# Patient Record
Sex: MALE | Race: AMERICAN INDIAN OR ALASKA NATIVE
[De-identification: names, ages, dates, MRNs, and addresses within clinical notes are randomized per-mention and may not be internally consistent; named-entity substitution may affect disease eponyms.]

---

## 2020-04-23 ENCOUNTER — HOSPITAL ENCOUNTER (EMERGENCY)
Dept: HOSPITAL 5 - ED | Age: 40
LOS: 1 days | Discharge: HOME | End: 2020-04-24
Payer: SELF-PAY
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
## 2020-04-23 DIAGNOSIS — F10.129: ICD-10-CM
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
## 2020-04-23 DIAGNOSIS — S39.82XA: Primary | ICD-10-CM
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
## 2020-04-23 PROCEDURE — 96366 THER/PROPH/DIAG IV INF ADDON: CPT

## 2020-04-23 PROCEDURE — G0480 DRUG TEST DEF 1-7 CLASSES: HCPCS

## 2020-04-23 PROCEDURE — 99285 EMERGENCY DEPT VISIT HI MDM: CPT

## 2020-04-23 PROCEDURE — 70450 CT HEAD/BRAIN W/O DYE: CPT

## 2020-04-23 PROCEDURE — 71260 CT THORAX DX C+: CPT

## 2020-04-23 PROCEDURE — 72072 X-RAY EXAM THORAC SPINE 3VWS: CPT

## 2020-04-23 PROCEDURE — 72125 CT NECK SPINE W/O DYE: CPT

## 2020-04-23 PROCEDURE — 85730 THROMBOPLASTIN TIME PARTIAL: CPT

## 2020-04-23 PROCEDURE — 96365 THER/PROPH/DIAG IV INF INIT: CPT

## 2020-04-23 PROCEDURE — 85610 PROTHROMBIN TIME: CPT

## 2020-04-23 PROCEDURE — 36415 COLL VENOUS BLD VENIPUNCTURE: CPT

## 2020-04-23 PROCEDURE — 80053 COMPREHEN METABOLIC PANEL: CPT

## 2020-04-23 PROCEDURE — 96372 THER/PROPH/DIAG INJ SC/IM: CPT

## 2020-04-23 PROCEDURE — 85025 COMPLETE CBC W/AUTO DIFF WBC: CPT

## 2020-04-23 PROCEDURE — 72100 X-RAY EXAM L-S SPINE 2/3 VWS: CPT

## 2020-04-23 PROCEDURE — 80320 DRUG SCREEN QUANTALCOHOLS: CPT

## 2020-04-23 PROCEDURE — 74177 CT ABD & PELVIS W/CONTRAST: CPT

## 2020-04-24 VITALS — SYSTOLIC BLOOD PRESSURE: 159 MMHG | DIASTOLIC BLOOD PRESSURE: 92 MMHG

## 2020-04-24 LAB
ALBUMIN SERPL-MCNC: 3.9 G/DL (ref 3.9–5)
ALT SERPL-CCNC: 63 UNITS/L (ref 7–56)
APTT BLD: 30.6 SEC. (ref 24.2–36.6)
BASOPHILS # (AUTO): 0.1 K/MM3 (ref 0–0.1)
BASOPHILS NFR BLD AUTO: 1.5 % (ref 0–1.8)
BUN SERPL-MCNC: 5 MG/DL (ref 9–20)
BUN/CREAT SERPL: 6 %
CALCIUM SERPL-MCNC: 8.8 MG/DL (ref 8.4–10.2)
EOSINOPHIL # BLD AUTO: 0 K/MM3 (ref 0–0.4)
EOSINOPHIL NFR BLD AUTO: 0.7 % (ref 0–4.3)
HCT VFR BLD CALC: 39.4 % (ref 35.5–45.6)
HEMOLYSIS INDEX: 2
HGB BLD-MCNC: 13.8 GM/DL (ref 11.8–15.2)
INR PPP: 0.99 (ref 0.87–1.13)
LYMPHOCYTES # BLD AUTO: 2 K/MM3 (ref 1.2–5.4)
LYMPHOCYTES NFR BLD AUTO: 42 % (ref 13.4–35)
MCHC RBC AUTO-ENTMCNC: 35 % (ref 32–34)
MCV RBC AUTO: 77 FL (ref 84–94)
MONOCYTES # (AUTO): 0.5 K/MM3 (ref 0–0.8)
MONOCYTES % (AUTO): 11 % (ref 0–7.3)
PLATELET # BLD: 296 K/MM3 (ref 140–440)
RBC # BLD AUTO: 5.13 M/MM3 (ref 3.65–5.03)

## 2020-04-24 NOTE — XRAY REPORT
THORACIC SPINE, 3 VIEWS



INDICATION / CLINICAL INFORMATION:

ETOH, Fall, Back pain.



COMPARISON:

None available.



FINDINGS:

Thoracic spine appears unremarkable. Vertebral body heights are maintained. Alignment is normal. No v
isible fracture.



IMPRESSION: Negative exam.



Signer Name: Nayeli Hand MD 

Signed: 4/24/2020 6:14 AM

Workstation Name: [id number]

## 2020-04-24 NOTE — CAT SCAN REPORT
CT abdomen pelvis w con, CT chest w con 



INDICATION / CLINICAL INFORMATION:

Pt intoxicated, post-fall, now with headache, back pain.  Trauma Omnipaque 300 / 100ml's was used for
 this exam..



TECHNIQUE:

Axial CT imaging of chest, abdomen and pelvis was obtained with IV contrast. Coronal and sagittal ref
ormatted imaging obtained and reviewed.  All CT scans at this location are performed using CT dose re
duction for ALARA by means of automated exposure control. 



COMPARISON:

None available.



FINDINGS:

CT chest with contrast is unremarkable. No mediastinal mass. Thoracic aorta is normal. No aortic aneu
rysm or dissection. No pericardial effusion. Both lungs are well aerated and clear. No evidence for p
ulmonary contusion, pneumonia, pleural effusion, or pneumothorax.



There is incidental finding of bilateral axillary adenopathy. No mediastinal or hilar adenopathy.



CT abdomen with contrast demonstrates normal appearance of the liver, spleen, pancreas, kidneys, and 
adrenal glands. Gallbladder is mostly collapsed. No biliary dilatation. No free air or free fluid.



CT pelvis with contrast demonstrates normal appearance of the appendix. No pelvic mass, free fluid, o
r focal inflammatory change. There is prominent bilateral inguinal adenopathy.



No evidence for fracture within the chest, abdomen, or pelvis.



IMPRESSION:

1. No evidence for traumatic injury within the chest, abdomen, or pelvis.

2. Incidental finding of prominent bilateral axillary adenopathy and bilateral inguinal adenopathy. T
his is nonspecific, but this pattern can be seen in patients who are HIV positive or otherwise immuno
suppressed. This finding can also be seen in lymphoma. Please correlate with any pertinent clinical h
istory.



Signer Name: Nayeli Hand MD 

Signed: 4/24/2020 4:44 AM

Workstation Name: [id number]

## 2020-04-24 NOTE — EMERGENCY DEPARTMENT REPORT
<WHITNEY LEDESMA - Last Filed: 20 04:38>





ED Fall HPI





- General


Chief Complaint: Fall


Stated Complaint: RIGHT LEG AND NECK PAIN,ETOH


Time Seen by Provider: 20 00:36


Source: patient, EMS


Mode of arrival: Ambulatory


Limitations: Altered Mental Status (secondary to intoxication)





- History of Present Illness


Initial Comments: 





Difficult to perform full HPI secondary to patient's intoxication, patient is a 

poor historian





Patient is a 40-year-old male who presents emergency room with complaints of a 

fall that occurred tonight.  He states he was "jumping into something and fell 

in the mud".  I asked patient what he was jumping into and he begins to laugh. 

He states he hit his head.  He states he has back pain.  Upon questioning 

patient if he lost consciousness he laughs.  No past medical history per patient

no known allergies per patient. I asked pt if he drank alcohol tonight and he 

states yes that he drank four locos, I asked pt how much he had to drink and he 

stated "alot" 





- Related Data


                                Home Medications











 Medication  Instructions  Recorded  Confirmed  Last Taken


 


No Known Home Medications [No  20 Unknown





Reported Home Medications]    











                                    Allergies











Allergy/AdvReac Type Severity Reaction Status Date / Time


 


No Known Allergies Allergy   Verified 20 04:29














ED Review of Systems


Comment: Unobtainable due to pts medical conditions





ED Past Medical Hx





- Past Medical History


Previous Medical History?: No





- Surgical History


Past Surgical History?: No





- Social History


Smoking Status: Current Every Day Smoker


Substance Use Type: Alcohol





- Medications


Home Medications: 


                                Home Medications











 Medication  Instructions  Recorded  Confirmed  Last Taken  Type


 


No Known Home Medications [No  20 Unknown History





Reported Home Medications]     














ED Physical Exam





- General


Limitations: No Limitations


General appearance: appears intoxicated





- Head


Head exam: Present: atraumatic, normocephalic





- Eye


Eye exam: Present: PERRL, EOMI, conjunctival injection (bilaterally)





- ENT


ENT exam: Present: mucous membranes dry





- Neck


Neck exam: Present: normal inspection, full ROM





- Respiratory


Respiratory exam: Present: normal lung sounds bilaterally.  Absent: respiratory 

distress, wheezes, rales, rhonchi, stridor, chest wall tenderness, accessory 

muscle use, decreased breath sounds, prolonged expiratory





- Cardiovascular


Cardiovascular Exam: Present: regular rate, normal rhythm, normal heart sounds. 

Absent: systolic murmur, diastolic murmur, rubs, gallop





- Extremities Exam


Extremities exam: Present: other (spontaneously moving all extremities and 

ambulating without difficulty)





- Back Exam


Back exam: Present: normal inspection, full ROM, other (upon palpation of the 

midline spine he states that everywhere hurts but then begins to laugh, there 

are no step offs, no obvious deformities)





- Neurological Exam


Neurological exam: Present: alert, normal gait, other (oriented to name and )





- Expanded Neurological Exam


  ** Expanded


Best Eye Response ([place]): (4) open spontaneously


Best Motor Response ([place]): (6) obeys commands


Best Verbal Response ([place]): (4) confused conversation


[place] Total: 14





- Skin


Skin exam: Present: warm, dry, intact





ED Medical Decision Making





- Lab Data


Result diagrams: 


                                 20 01:07





                                 20 01:07





ED Disposition


Clinical Impression: 


 Alcohol intoxication, Fall, Injury of back due to fall





Disposition: DC-01 TO HOME OR SELFCARE


Condition: Stable


Instructions:  Alcohol Intoxication (ED), Abuse of Alcohol (ED)


Additional Instructions: 


Please follow-up with a primary care physician in the next few days.  Please 

avoid any further alcohol abuse.  Return to the emergency department with any 

worsening of your symptoms or any acute distress.


Referrals: 


[facility] [Provider Group] - 2-3 Days





<BEHT GUARDADO - Last Filed: 20 06:26>





ED Medical Decision Making





- Lab Data


Result diagrams: 


                                 20 01:07





                                 20 01:07





- Radiology Data


Radiology results: report reviewed





CT head/brain wo con INDICATION / CLINICAL INFORMATION: Pt intoxicated, post-

fall, now with headache, Positive L.O.C.. TECHNIQUE: Axial CT imaging of the 

brain was obtained without contrast. Coronal and sagittal reformatted imaging 

obtained and reviewed. All CT scans at this location are performed using CT dose

reduction for ALARA by means of automated exposure control. COMPARISON: None 

available. FINDINGS: No intracranial hemorrhage, mass, or midline shift. No 

extra-axial fluid collection or suggestion of acute territorial infarction. 

Ventricular system and basilar cisterns are unremarkable. Visualized paranasal 

sinuses and mastoid air cells are well aerated and clear. No visible calvarial 

fracture. IMPRESSION: 1. Negative noncontrasted head CT scan. 





CT cervical spine wo con INDICATION / CLINICAL INFORMATION: Pt intoxicated, 

post-fall, now with headache, Positive L.O.C.. TECHNIQUE: Axial CT imaging of 

the cervical spine was obtained without contrast. Coronal and sagittal 

reformatted imaging obtained and reviewed. All CT scans at this location are 

performed using CT dose reduction for ALARA by means of automated exposure 

control. COMPARISON: None available. FINDINGS: No cervical spine fracture or 

malalignment is noted. Vertebral body heights and disc spaces are well-

preserved. No significant degenerative change. Paravertebral soft tissues are 

unremarkable. Visualized lung apices are clear. IMPRESSION: 1. No evidence for 

fracture or malalignment. 





CT abdomen pelvis w con, CT chest w con INDICATION / CLINICAL INFORMATION: Pt 

intoxicated, post-fall, now with headache, back pain. Trauma Omnipaque 300 / 

100ml's was used for this exam.. TECHNIQUE: Axial CT imaging of chest, abdomen 

and pelvis was obtained with IV contrast. Coronal and sagittal reformatted 

imaging obtained and reviewed. All CT scans at this location are performed using

CT dose reduction for ALARA by means of automated exposure control. COMPARISON: 

None available. FINDINGS: CT chest with contrast is unremarkable. No mediastinal

mass. Thoracic aorta is normal. No aortic aneurysm or dissection. No pericardial

effusion. Both lungs are well aerated and clear. No evidence for pulmonary 

contusion, pneumonia, pleural effusion, or pneumothorax. There is incidental 

finding of bilateral axillary adenopathy. No mediastinal or hilar adenopathy. CT

abdomen with contrast demonstrates normal appearance of the liver, spleen, 

pancreas, kidneys, and adrenal glands. Gallbladder is mostly collapsed. No 

biliary dilatation. No free air or free flu id. CT pelvis with contrast demonst

rates normal appearance of the appendix. No pelvic mass, free fluid, or focal 

inflammatory change. There is prominent bilateral inguinal adenopathy. No 

evidence for fracture within the chest, abdomen, or pelvis. IMPRESSION: 1. No 

evidence for traumatic injury within the chest, abdomen, or pelvis. 2. 

Incidental finding of prominent bilateral axillary adenopathy and bilateral 

inguinal adenopathy. This is nonspecific, but this pattern can be seen in 

patients who are HIV positive or otherwise immunosuppressed. This finding can 

also be seen in lymphoma. Please correlate with any pertinent clinical history. 





CT abdomen pelvis w con, CT chest w con INDICATION / CLINICAL INFORMATION: Pt 

intoxicated, post-fall, now with headache, back pain. Trauma Omnipaque 300 / 10

0ml's was used for this exam.. TECHNIQUE: Axial CT imaging of chest, abdomen and

pelvis was obtained with IV contrast. Coronal and sagittal reformatted imaging 

obtained and reviewed. All CT scans at this location are performed using CT dose

reduction for ALARA by means of automated exposure control. COMPARISON: None 

available. FINDINGS: CT chest with contrast is unremarkable. No mediastinal 

mass. Thoracic aorta is normal. No aortic aneurysm or dissection. No pericardial

effusion. Both lungs are well aerated and clear. No evidence for pulmonary 

contusion, pneumonia, pleural effusion, or pneumothorax. There is incidental 

finding of bilateral axillary adenopathy. No mediastinal or hilar adenopathy. CT

abdomen with contrast demonstrates normal appearance of the liver, spleen, 

pancreas, kidneys, and adrenal glands. Gallbladder is mostly collapsed. No 

biliary dilatation. No free air or free flu id. CT pelvis with contrast 

demonstrates normal appearance of the appendix. No pelvic mass, free fluid, or 

focal inflammatory change. There is prominent bilateral inguinal adenopathy. No 

evidence for fracture within the chest, abdomen, or pelvis. IMPRESSION: 1. No 

evidence for traumatic injury within the chest, abdomen, or pelvis. 2. 

Incidental finding of prominent bilateral axillary adenopathy and bilateral 

inguinal adenopathy. This is nonspecific, but this pattern can be seen in 

patients who are HIV positive or otherwise immunosuppressed. This finding can 

also be seen in lymphoma. Please correlate with any pertinent clinical history. 





- Medical Decision Making


This patient was initially seen by the midlevel provider.  He has admitted to 

alcohol abuse and intoxication and came in with some complaints of a headache, 

back pain after a fall.  His blood alcohol level is 0.26 at 1:00 AM and 

therefore it is still most likely close to 0.22 at this time.  The patient is 

adamant about leaving the emergency department.  While he is seen ambulating 

without any instability and does not have any obvious signs of trauma, the 

patient does appear intoxicated.  The patient was instructed to remain in the 

emergency department until he can be medically cleared and is clinically sober, 

or if someone can come and take responsibility for him and safely get him home. 

The patient is refusing to remain in the emergency department at this time.  He 

has been made a 2013 but has eloped from the emergency department.  The police 

will be notified.








The patient was brought back in by police.  He was brought back to room 16.  The

patient had a CT scan of the head, cervical spine, chest and abdomen.  The 

patient had x-rays of the thoracic and lumbar spine.  There have been no 

fractures, bleeding, or acute pathology found with any of his imaging.  The 

patient has been receiving some IV fluid via banana bag.  This will be signed 

out to a colleague and the patient will be discharged when he is either sober or

someone comes to pick him up and take responsibility for him.





<CHRIS PAYTON - Last Filed: 20 11:11>





ED Review of Systems


ROS: 


Stated complaint: RIGHT LEG AND NECK PAIN,ETOH


Other details as noted in HPI








ED Course





                                   Vital Signs











  20





  22:05 03:21 07:47


 


Temperature 98.0 F 98.0 F 96.8 F L


 


Pulse Rate 93 H 89 82


 


Respiratory 18 18 20





Rate   


 


Blood Pressure 151/96 141/87 159/92





[Right]   


 


O2 Sat by Pulse 98 97 96





Oximetry   














ED Medical Decision Making





- Lab Data


Result diagrams: 


                                 20 01:07





                                 20 01:07





- Medical Decision Making





I received this pt at sign-out from Dr Guardado.  Pt presented w/ ETOH 

intoxication.  He is currently awake and alert, answers questions appropriately.

  Appears to be clinically sober.  Seen by mental health , resources 

given.   rescinded.  Will d/c home at this time.


Critical care attestation.: 


If time is entered above; I have spent that time in minutes in the direct care 

of this critically ill patient, excluding procedure time.








ED Disposition


Is pt being admited?: No

## 2020-04-24 NOTE — CAT SCAN REPORT
CT head/brain wo con 



INDICATION / CLINICAL INFORMATION:

Pt intoxicated, post-fall, now with headache, Positive L.O.C..



TECHNIQUE:

Axial CT imaging of the brain was obtained without contrast. Coronal and sagittal reformatted imaging
 obtained and reviewed.  All CT scans at this location are performed using CT dose reduction for ALAR
A by means of automated exposure control. 



COMPARISON:

None available.



FINDINGS:

No intracranial hemorrhage, mass, or midline shift. No extra-axial fluid collection or suggestion of 
acute territorial infarction. Ventricular system and basilar cisterns are unremarkable.



Visualized paranasal sinuses and mastoid air cells are well aerated and clear. No visible calvarial f
racture.



IMPRESSION:

1. Negative noncontrasted head CT scan. 



Signer Name: Nayeli Hand MD 

Signed: 4/24/2020 4:25 AM

Workstation Name: [id number]

## 2020-04-24 NOTE — CAT SCAN REPORT
CT cervical spine wo con 



INDICATION / CLINICAL INFORMATION:

Pt intoxicated, post-fall, now with headache, Positive L.O.C..



TECHNIQUE:

Axial CT imaging of the cervical spine was obtained without contrast. Coronal and sagittal reformatte
d imaging obtained and reviewed.  All CT scans at this location are performed using CT dose reduction
 for ALARA by means of automated exposure control. 



COMPARISON:

None available.



FINDINGS:

No cervical spine fracture or malalignment is noted. Vertebral body heights and disc spaces are well-
preserved. No significant degenerative change.



Paravertebral soft tissues are unremarkable.



Visualized lung apices are clear.



IMPRESSION:

1. No evidence for fracture or malalignment. 



Signer Name: Nayeli Hand MD 

Signed: 4/24/2020 4:28 AM

Workstation Name: [email or web address]

## 2020-04-24 NOTE — XRAY REPORT
LUMBAR SPINE, 3 VIEWS



INDICATION / CLINICAL INFORMATION:

ETOH, fall, back pain.



COMPARISON:

None available.



FINDINGS:

Vertebral body heights and disc spaces are normal. Alignment is normal. No fracture or significant de
generative change identified.

Contrast is present in the urinary tract consistent with excretion of IV contrast given for CT scan e
arlier today.





IMPRESSION: No significant skeletal abnormality.



Signer Name: Nayeli Hand MD 

Signed: 4/24/2020 6:16 AM

Workstation Name: [id number]

## 2020-04-24 NOTE — CAT SCAN REPORT
CT abdomen pelvis w con, CT chest w con 



INDICATION / CLINICAL INFORMATION:

Pt intoxicated, post-fall, now with headache, back pain.  Trauma Omnipaque 300 / 100ml's was used for
 this exam..



TECHNIQUE:

Axial CT imaging of chest, abdomen and pelvis was obtained with IV contrast. Coronal and sagittal ref
ormatted imaging obtained and reviewed.  All CT scans at this location are performed using CT dose re
duction for ALARA by means of automated exposure control. 



COMPARISON:

None available.



FINDINGS:

CT chest with contrast is unremarkable. No mediastinal mass. Thoracic aorta is normal. No aortic aneu
rysm or dissection. No pericardial effusion. Both lungs are well aerated and clear. No evidence for p
ulmonary contusion, pneumonia, pleural effusion, or pneumothorax.



There is incidental finding of bilateral axillary adenopathy. No mediastinal or hilar adenopathy.



CT abdomen with contrast demonstrates normal appearance of the liver, spleen, pancreas, kidneys, and 
adrenal glands. Gallbladder is mostly collapsed. No biliary dilatation. No free air or free fluid.



CT pelvis with contrast demonstrates normal appearance of the appendix. No pelvic mass, free fluid, o
r focal inflammatory change. There is prominent bilateral inguinal adenopathy.



No evidence for fracture within the chest, abdomen, or pelvis.



IMPRESSION:

1. No evidence for traumatic injury within the chest, abdomen, or pelvis.

2. Incidental finding of prominent bilateral axillary adenopathy and bilateral inguinal adenopathy. T
his is nonspecific, but this pattern can be seen in patients who are HIV positive or otherwise immuno
suppressed. This finding can also be seen in lymphoma. Please correlate with any pertinent clinical h
istory.



Signer Name: Nayeli Hand MD 

Signed: 4/24/2020 4:44 AM

Workstation Name: [email or web address]

## 2021-02-06 ENCOUNTER — OUT OF OFFICE VISIT (OUTPATIENT)
Dept: URBAN - METROPOLITAN AREA MEDICAL CENTER 33 | Facility: MEDICAL CENTER | Age: 41
End: 2021-02-06
Payer: SELF-PAY

## 2021-02-06 DIAGNOSIS — K29.60 ADENOPAPILLOMATOSIS GASTRICA: ICD-10-CM

## 2021-02-06 DIAGNOSIS — K92.0 BLOODY EMESIS: ICD-10-CM

## 2021-02-06 DIAGNOSIS — G93.40 ACUTE ENCEPHALOPATHY: ICD-10-CM

## 2021-02-06 DIAGNOSIS — B96.81 BACTERIAL INFECTION DUE TO H. PYLORI: ICD-10-CM

## 2021-02-06 DIAGNOSIS — K70.9 ACUTE ON CHRONIC ALCOHOLIC LIVER DISEASE: ICD-10-CM

## 2021-02-06 DIAGNOSIS — R11.2 ACUTE NAUSEA WITH NONBILIOUS VOMITING: ICD-10-CM

## 2021-02-06 DIAGNOSIS — K21.00 ALKALINE REFLUX ESOPHAGITIS: ICD-10-CM

## 2021-02-06 DIAGNOSIS — K29.80 ACUTE DUODENITIS: ICD-10-CM

## 2021-02-06 PROCEDURE — 99233 SBSQ HOSP IP/OBS HIGH 50: CPT | Performed by: INTERNAL MEDICINE

## 2021-02-06 PROCEDURE — 43239 EGD BIOPSY SINGLE/MULTIPLE: CPT | Performed by: INTERNAL MEDICINE

## 2021-02-06 PROCEDURE — 99255 IP/OBS CONSLTJ NEW/EST HI 80: CPT | Performed by: INTERNAL MEDICINE

## 2021-03-03 ENCOUNTER — TELEPHONE ENCOUNTER (OUTPATIENT)
Dept: URBAN - METROPOLITAN AREA CLINIC 92 | Facility: CLINIC | Age: 41
End: 2021-03-03

## 2021-03-03 PROBLEM — 8493009: Status: ACTIVE | Noted: 2021-03-03

## 2021-03-03 RX ORDER — OMEPRAZOLE MAGNESIUM, AMOXICILLIN AND RIFABUTIN 10; 250; 12.5 MG/1; MG/1; MG/1
4 CAPSULES CAPSULE, DELAYED RELEASE ORAL
Qty: 168 CAPSULE | Refills: 1 | OUTPATIENT
Start: 2021-03-03 | End: 2021-03-31